# Patient Record
Sex: FEMALE | Race: BLACK OR AFRICAN AMERICAN
[De-identification: names, ages, dates, MRNs, and addresses within clinical notes are randomized per-mention and may not be internally consistent; named-entity substitution may affect disease eponyms.]

---

## 2017-04-14 ENCOUNTER — HOSPITAL ENCOUNTER (EMERGENCY)
Dept: HOSPITAL 62 - ER | Age: 27
Discharge: HOME | End: 2017-04-14
Payer: COMMERCIAL

## 2017-04-14 VITALS — DIASTOLIC BLOOD PRESSURE: 75 MMHG | SYSTOLIC BLOOD PRESSURE: 111 MMHG

## 2017-04-14 DIAGNOSIS — H00.011: Primary | ICD-10-CM

## 2017-04-14 DIAGNOSIS — Z86.14: ICD-10-CM

## 2017-04-14 DIAGNOSIS — H01.001: ICD-10-CM

## 2017-04-14 DIAGNOSIS — F17.200: ICD-10-CM

## 2017-04-14 LAB
ALBUMIN SERPL-MCNC: 4.6 G/DL (ref 3.5–5)
ALP SERPL-CCNC: 66 U/L (ref 38–126)
ALT SERPL-CCNC: 29 U/L (ref 9–52)
ANION GAP SERPL CALC-SCNC: 14 MMOL/L (ref 5–19)
AST SERPL-CCNC: 15 U/L (ref 14–36)
BASOPHILS # BLD AUTO: 0.1 10^3/UL (ref 0–0.2)
BASOPHILS NFR BLD AUTO: 1.1 % (ref 0–2)
BILIRUB DIRECT SERPL-MCNC: 0.1 MG/DL (ref 0–0.4)
BILIRUB SERPL-MCNC: 0.6 MG/DL (ref 0.2–1.3)
BUN SERPL-MCNC: 14 MG/DL (ref 7–20)
CALCIUM: 9.4 MG/DL (ref 8.4–10.2)
CHLORIDE SERPL-SCNC: 106 MMOL/L (ref 98–107)
CO2 SERPL-SCNC: 23 MMOL/L (ref 22–30)
CREAT SERPL-MCNC: 0.73 MG/DL (ref 0.52–1.25)
EOSINOPHIL # BLD AUTO: 0.1 10^3/UL (ref 0–0.6)
EOSINOPHIL NFR BLD AUTO: 1.3 % (ref 0–6)
ERYTHROCYTE [DISTWIDTH] IN BLOOD BY AUTOMATED COUNT: 13.4 % (ref 11.5–14)
GLUCOSE SERPL-MCNC: 91 MG/DL (ref 75–110)
HCT VFR BLD CALC: 32.2 % (ref 36–47)
HGB BLD-MCNC: 11.3 G/DL (ref 12–15.5)
HGB HCT DIFFERENCE: 1.7
LYMPHOCYTES # BLD AUTO: 2.3 10^3/UL (ref 0.5–4.7)
LYMPHOCYTES NFR BLD AUTO: 38.6 % (ref 13–45)
MCH RBC QN AUTO: 30.6 PG (ref 27–33.4)
MCHC RBC AUTO-ENTMCNC: 35.1 G/DL (ref 32–36)
MCV RBC AUTO: 87 FL (ref 80–97)
MONOCYTES # BLD AUTO: 0.5 10^3/UL (ref 0.1–1.4)
MONOCYTES NFR BLD AUTO: 8.1 % (ref 3–13)
NEUTROPHILS # BLD AUTO: 3 10^3/UL (ref 1.7–8.2)
NEUTS SEG NFR BLD AUTO: 50.9 % (ref 42–78)
POTASSIUM SERPL-SCNC: 3.7 MMOL/L (ref 3.6–5)
PROT SERPL-MCNC: 8.3 G/DL (ref 6.3–8.2)
RBC # BLD AUTO: 3.69 10^6/UL (ref 3.72–5.28)
SODIUM SERPL-SCNC: 143.3 MMOL/L (ref 137–145)
WBC # BLD AUTO: 6 10^3/UL (ref 4–10.5)

## 2017-04-14 PROCEDURE — 80053 COMPREHEN METABOLIC PANEL: CPT

## 2017-04-14 PROCEDURE — 96365 THER/PROPH/DIAG IV INF INIT: CPT

## 2017-04-14 PROCEDURE — 36415 COLL VENOUS BLD VENIPUNCTURE: CPT

## 2017-04-14 PROCEDURE — 99283 EMERGENCY DEPT VISIT LOW MDM: CPT

## 2017-04-14 PROCEDURE — 96375 TX/PRO/DX INJ NEW DRUG ADDON: CPT

## 2017-04-14 PROCEDURE — 85025 COMPLETE CBC W/AUTO DIFF WBC: CPT

## 2017-04-14 NOTE — ER DOCUMENT REPORT
HPI





- HPI


Pain Level: 5





- REPRODUCTIVE


Reproductive: DENIES: Pregnant:





- DERM


Skin Color: Normal





Past Medical History





- Social History


Family History: Arthritis, CAD, CVA, DM, Hyperlipidemia, Hypertension, 

Malignancy, Thyroid Disfunction


Patient has suicidal ideation: No


Patient has homicidal ideation: No


Pulmonary Medical History: Reports: Hx Bronchitis, Hx Pneumonia


Renal/ Medical History: Denies: Hx Peritoneal Dialysis


Past Surgical History: Reports: Hx Tonsillectomy





- Immunizations


Hx Diphtheria, Pertussis, Tetanus Vaccination: No - declined





Vertical Provider Document





- INFECTION CONTROL


TRAVEL OUTSIDE OF THE [place] IN LAST 30 DAYS: No





- RESPIRATORY


O2 Sat by Pulse Oximetry: 99





Course





- Vital Signs


Vital signs: 


 











Temp Pulse Resp BP Pulse Ox


 


 98.4 F   102 H  17   121/88 H  99 


 


 04/14/17 11:23  04/14/17 11:23  04/14/17 11:23  04/14/17 11:23  04/14/17 11:23

## 2017-04-14 NOTE — ER DOCUMENT REPORT
HPI





- HPI


Patient complains to provider of: right upper eyelid stye


Onset: Other - 3 days ago


Onset/Duration: Gradual, Persistent, Worse


Pain Level: 5


Context: 


26-year-old female with a history of a left eyelid stye that resolved with warm 

compresses in spontaneous drainage, is now complaining of a stye in the right 

medial upper lid with swelling and pain that extends along the right upper lid 

margin.  She has a history of MRSA.  She states her vision is normal out of the 

right eye.


Associated Symptoms: None


Exacerbated by: Movement - Eyelid


Relieved by: Denies


Similar symptoms previously: Yes - left eyelid


Recently seen / treated by doctor: No





- ROS


ROS below otherwise negative: Yes


Systems Reviewed and Negative: Yes All other systems reviewed and negative





- REPRODUCTIVE


Reproductive: DENIES: Pregnant:





- DERM


Skin Color: Normal





Past Medical History





- General


Information source: Patient





- Social History


Smoking Status: Current Every Day Smoker


Frequency of alcohol use: None


Drug Abuse: None


Lives with: Family


Family History: Arthritis, CAD, CVA, DM, Hyperlipidemia, Hypertension, 

Malignancy, Thyroid Disfunction


Patient has suicidal ideation: No


Patient has homicidal ideation: No


Pulmonary Medical History: Reports: Hx Bronchitis, Hx Pneumonia


Renal/ Medical History: Denies: Hx Peritoneal Dialysis


Past Surgical History: Reports: Hx Tonsillectomy





- Immunizations


Hx Diphtheria, Pertussis, Tetanus Vaccination: No - declined





Vertical Provider Document





- CONSTITUTIONAL


Agree With Documented VS: Yes


Exam Limitations: No Limitations





- INFECTION CONTROL


TRAVEL OUTSIDE OF THE [place] IN LAST 30 DAYS: No





- HEENT


HEENT: Normocephalic, PERRLA.  negative: Conjuctival Injection


Notes: 


Medial right upper eyelid abscess at the tear duct, cellulitis extending along 

the eyelash border, puffy superior upper eyelid, and pink inferior orbital skin





- NECK


Neck: Supple





- RESPIRATORY


Respiratory: Breath Sounds Normal, No Respiratory Distress


O2 Sat by Pulse Oximetry: 99





- CARDIOVASCULAR


Cardiovascular: Regular Rate, Regular Rhythm





- MUSCULOSKELETAL/EXTREMETIES


Musculoskeletal/Extremeties: MAEW, FROM





- NEURO


Level of Consciousness: Awake, Alert, Appropriate





- DERM


Integumentary: Warm, Dry





Course





- Re-evaluation


Re-evalutation: 





04/14/17 12:53


Dr. Chopra looked at the patient warm compresses oral antibiotics is fine.





- Vital Signs


Vital signs: 


 











Temp Pulse Resp BP Pulse Ox


 


 98.4 F   102 H  17   121/88 H  99 


 


 04/14/17 11:23  04/14/17 11:23  04/14/17 11:23  04/14/17 11:23  04/14/17 11:36














- Laboratory


Result Diagrams: 


 04/14/17 12:10





 04/14/17 12:10





Discharge





- Discharge


Clinical Impression: 


Blepharitis


Qualifiers:


 Blepharitis type: unspecified type Laterality: bilateral Eyelid: both upper 

and lower Qualified Code(s): H01.001 - Unspecified blepharitis right upper 

eyelid





Stye


Qualifiers:


 Laterality: right Eyelid: upper Qualified Code(s): H00.011 - Hordeolum 

externum right upper eyelid





Condition: Good


Disposition: HOME, SELF-CARE


Instructions:  Clindamycin ([facility]), Use of Over-The-Counter Ibuprofen ([facility]), Warm 

Packs ([facility]), Sty ([facility]), Acetaminophen


Additional Instructions: 


Wash her eyelids with baby shampoo and fingernails to remove makeup and oils


Warm compress to the right eye stye 4 times a day for 30 minutes


Return to the emergency room if worse


Take the antibiotics until gone


Return to the emergency room any concerns





Please complete the patient satisfaction survey if you get one, and return it.. 

If you do not receive a survey,  then you can go to the [facility] website, [email or web address] 

and place your comments about your very good care. Thank you very much. It was 

a pleasure being your medical provider today.


Prescriptions: 


Ibuprofen [Motrin 600 mg Tablet] 600 mg PO Q8HP PRN #30 tablet


 PRN Reason: 


Clindamycin HCl [Cleocin 150 mg Capsule] 300 mg PO TID #42 capsule


Forms:  Return to Work

## 2017-06-05 ENCOUNTER — HOSPITAL ENCOUNTER (EMERGENCY)
Dept: HOSPITAL 62 - ER | Age: 27
Discharge: HOME | End: 2017-06-05
Payer: COMMERCIAL

## 2017-06-05 VITALS — DIASTOLIC BLOOD PRESSURE: 78 MMHG | SYSTOLIC BLOOD PRESSURE: 118 MMHG

## 2017-06-05 DIAGNOSIS — L02.412: Primary | ICD-10-CM

## 2017-06-05 DIAGNOSIS — L02.411: ICD-10-CM

## 2017-06-05 DIAGNOSIS — F17.210: ICD-10-CM

## 2017-06-05 PROCEDURE — 99283 EMERGENCY DEPT VISIT LOW MDM: CPT

## 2017-06-05 PROCEDURE — 0H9CXZZ DRAINAGE OF LEFT UPPER ARM SKIN, EXTERNAL APPROACH: ICD-10-PCS | Performed by: NURSE PRACTITIONER

## 2017-06-05 PROCEDURE — 87205 SMEAR GRAM STAIN: CPT

## 2017-06-05 PROCEDURE — 87075 CULTR BACTERIA EXCEPT BLOOD: CPT

## 2017-06-05 PROCEDURE — 10061 I&D ABSCESS COMP/MULTIPLE: CPT

## 2017-06-05 PROCEDURE — 0H9BXZZ DRAINAGE OF RIGHT UPPER ARM SKIN, EXTERNAL APPROACH: ICD-10-PCS | Performed by: NURSE PRACTITIONER

## 2017-06-05 PROCEDURE — 87070 CULTURE OTHR SPECIMN AEROBIC: CPT

## 2017-06-05 PROCEDURE — 87186 SC STD MICRODIL/AGAR DIL: CPT

## 2017-06-05 PROCEDURE — 87077 CULTURE AEROBIC IDENTIFY: CPT

## 2017-06-05 NOTE — ER DOCUMENT REPORT
ED Skin Rash/Insect Bite/Abscs





- General


Chief Complaint: Abscess


Stated Complaint: ARM PAIN


Time Seen by Provider: 06/05/17 11:46


Mode of Arrival: Ambulatory


Information source: Patient


Notes: 


26-year-old female presents to ED for abscess to both axilla.  Started about 3-

4 days ago.  Last menstrual period was May 22.  Medical history is cellulitis 

and abscesses.


TRAVEL OUTSIDE OF THE [place] IN LAST 30 DAYS: No





- HPI


Patient complains to provider of: Tender/swollen area


Onset: Other - Days ago


Onset/Duration: Gradual


Quality of pain: Sharp, Throbbing


Severity: Severe


Pain Level: 5


Skin Character: Abscess


Quality of rash: Painful


Identify cause: No


Exacerbated by: Movement


Relieved by: Denies


Similar symptoms previously: Yes


Recently seen / treated by doctor: No





- Related Data


Allergies/Adverse Reactions: 


 





No Known Allergies Allergy (Verified 06/05/17 10:46)


 











Past Medical History





- General


Information source: Patient





- Social History


Smoking Status: Current Some Day Smoker


Cigarette use (# per day): Yes - Twice a week


Chew tobacco use (# tins/day): No


Family History: Arthritis, CAD, CVA, DM, Hyperlipidemia, Hypertension, 

Malignancy, Thyroid Disfunction


Patient has suicidal ideation: No


Patient has homicidal ideation: No


Pulmonary Medical History: Reports: Hx Bronchitis, Hx Pneumonia


EENT Medical History: Reports: None


Neurological Medical History: Reports: None


Endocrine Medical History: Reports: None


Renal/ Medical History: Reports: None


Malignancy Medical History: Reports: None


GI Medical History: Reports: None


Musculoskeltal Medical History: Reports None


Skin Medical History: Reports None


Psychiatric Medical History: Reports: None


Traumatic Medical History: Reports: None


Infectious Medical History: Reports: None


Past Surgical History: Reports: Hx Tonsillectomy





- Immunizations


Hx Diphtheria, Pertussis, Tetanus Vaccination: No - declined





Review of Systems





- Review of Systems


Constitutional: No symptoms reported


EENT: No symptoms reported


Cardiovascular: No symptoms reported


Respiratory: No symptoms reported


Gastrointestinal: No symptoms reported


Genitourinary: No symptoms reported


Female Genitourinary: No symptoms reported


Musculoskeletal: No symptoms reported


Skin: Other - From the bilateral axilla


Hematologic/Lymphatic: No symptoms reported


Neurological/Psychological: No symptoms reported


-: Yes All other systems reviewed and negative





Physical Exam





- Vital signs


Vitals: 


 











Temp Pulse Resp BP Pulse Ox


 


 98.5 F   105 H  18   118/78   98 


 


 06/05/17 10:46  06/05/17 10:46  06/05/17 10:46  06/05/17 10:46  06/05/17 10:46











Interpretation: Normal





- General


General appearance: Appears well, Alert





- HEENT


Head: Normocephalic, Atraumatic


Eyes: Normal


Pupils: PERRL





- Respiratory


Respiratory status: No respiratory distress


Chest status: Nontender


Breath sounds: Normal


Chest palpation: Normal





- Cardiovascular


Rhythm: Regular


Heart sounds: Normal auscultation


Murmur: No





- Abdominal


Inspection: Normal


Distension: No distension


Bowel sounds: Normal


Tenderness: Nontender


Organomegaly: No organomegaly





- Back


Back: Normal, Nontender





- Extremities


General upper extremity: Normal inspection, Nontender, Normal color, Normal ROM

, Normal temperature


General lower extremity: Normal inspection, Nontender, Normal color, Normal ROM

, Normal temperature, Normal weight bearing.  No: Hollis's sign





- Neurological


Neuro grossly intact: Yes


Cognition: Normal


Orientation: AAOx4


Dylan Coma Scale Eye Opening: Spontaneous


Bedford Coma Scale Verbal: Oriented


[place]ford Coma Scale Motor: Obeys Commands


[place]ford Coma Scale Total: 15


Speech: Normal


Motor strength normal: LUE, RUE, LLE, RLE


Sensory: Normal





- Psychological


Associated symptoms: Normal affect, Normal mood





- Skin


Skin Temperature: Warm


Skin Moisture: Dry


Skin Color: Normal


Skin irregularity: Abscess - Bilateral axilla





Course





- Vital Signs


Vital signs: 


 











Temp Pulse Resp BP Pulse Ox


 


 98.5 F   105 H  18   118/78   98 


 


 06/05/17 10:46  06/05/17 10:46  06/05/17 11:55  06/05/17 10:46  06/05/17 10:46














Procedures





- Incision and Drainage


  ** Right axilla


Type: Multiple


Anesthetic type: 1% Lidocaine


mL's of anesthetic: 6


Blade size: 11


I&D procedure: Other


Incision Method: Incision made with needle


Amount/type of drainage: large amount purulent drainage





  ** Left Axilla


Type: Multiple


Anesthetic type: 1% Lidocaine


mL's of anesthetic: 6


Blade size: 11


I&D procedure: Other


Incision Method: Incision made by scalpel


Amount/type of drainage: large amount of purulent drainage





Discharge





- Discharge


Clinical Impression: 


 Abscess





Condition: Stable


Disposition: HOME, SELF-CARE


Instructions:  Family Physicians / Practices


Additional Instructions: 


ABSCESS:





     You have an abscess (boil).  This a pus-forming infection, usually due to 

staph.  Some boils may be left to drain on their own, but most require lancing.


     From the time the tender lump first appears, it may be three or four days 

before the abscess is ready to yeni.  Local heat and rest help at this stage 

of treatment.  An antibiotic may prevent spread of the infection.


     Once the abscess is opened, packing may be placed into it.  This is done 

so pus is not sealed inside by premature closure of the cavity. The packing 

will be removed at your follow-up visit or you may be advised to remove it 

yourself at home.  Sometimes this packing must be replaced a few times during 

healing.


     The wound will heal with surprisingly little scar.  Depending on the size 

and location of an abscess, healing can take one to four weeks.


     You may shower and wash the area around the incision site two or three 

times a day.


     Antibiotics may be prescribed, but are usually not necessary after an 

abscess has been drained.


     If you develop fever, chills, worsening pain, or increasing swelling in 

the area, call the doctor or return immediately.








POST INCISION AND DRAINAGE:


     You have had an incision made to allow drainage of an abscess. The 

incision must remain open so that pus and debris can drain from the wound.


     If the abscess cavity is large, packing is placed.  This keeps the tissues 

from collapsing and trapping pus inside, while the body shrinks the cavity.  

The packing may need to be replaced every day or two.  The physician will 

instruct you on the packing.


     Keep a bulky dressing over the area.  Replace it if it becomes saturated 

with blood or pus.  Do not disturb the packing (if present).


     You may shower and cleanse the area with gentle soap and warm water two or 

three times a day.  Local warmth may be soothing, and may promote faster 

healing.


     Return if you develop high fever or chills, or if you note spreading 

redness, increasing swelling, or increasing tenderness.








MRSA CELLULITIS:


     You have an infection of your skin and underlying soft tissues called 

cellulitis.  This is due to bacteria, which can enter through any break in the 

skin, or even through an irritated hair follicle.  Untreated, cellulitis will 

usually worsen and may form an abscess which requires draining.


     Although many bacterial organisms can cause cellulitis and abscess 

formations, the most likely bacteria is Methicillin-Resistant Staph Aureus, or 

MRSA for short.  Antibiotics are required.  Usually, warm packs or warm soaks, 

and elevation of the infected area are recommended.  You should start getting 

better within 24 to 36 hours.


     Most infections respond quickly to the right medication. Follow-up care is 

important, however, to check for abscess (boil) formation, unsuspected foreign 

body, or resistant infection.


     If you develop fever, chills, or if the area of infection is becoming 

rapidly more swollen or painful, call the doctor at once.








ORAL NARCOTIC MEDICATION:


     You have been given a prescription for pain control.  This medication is a 

narcotic.  It's best taken with food, as nausea can result if taken on an empty 

stomach.


     Don't operate machinery or drive within six hours of taking this 

medication.  Do not combine this medicine with alcohol, or with any medication 

which can cause sedation (such as cold tablets or sleeping pills) unless you 

get permission from the physician.


     Narcotics tend to cause constipation.  If possible, drink plenty of fluids 

and eat a diet high in fiber and fruits.





Epsom Salt Soaks





     Soak the wound area in a container of warm epsom salt water.  If you can't 

get the wound area into a bucket or pan, use a folded towel soaked in the epsom 

salt solution and apply to the area.


     Use clean hot tap water (about the temperature of a very warm bath), 

mixing in about one (1) teaspoon for every pint of water.


           Two gallon --> 16 teaspoons Epsom Salts


           One gallon -->  8 teaspoons Epsom Salts


           Two quarts -->  4 teaspoons Epsom Salts


           One quart  -->  2 teaspoons Epsom Salts


     Soak the wound for about 20 minutes while gently moving it around in the 

water.  Repeat this four (4) times a day.





CEPHALEXIN:


     The antibiotic you've been prescribed is a member of the cephalosporin 

class.  This type of antibiotic covers a wide variety of infections, including 

those of the skin, lungs, and urinary tract. It's useful for staph infections.


     This antibiotic is slightly similar to the penicillin family. In rare cases

, a person who is allergic to penicillin will also be allergic to this 

medication.  If you have had a severe allergic reaction to penicillin, and have 

not taken this antibiotic since that time, notify your doctor.


     Antibiotics which cover many germs ("broad spectrum" antibiotics) are more 

likely to cause diarrhea or "yeast" infections.  Women prone to vaginal yeast 

problems may suffer an attack after taking this antibiotic.  In infants, oral 

thrush (white spots "stuck" on the cheek) or yeast diaper rash may result.  See 

your doctor if these problems occur.  Call at once if you develop itching, hives

, shortness of breath, or lightheadedness.








TRIMETHOPRIM-SULFA:


     You have been given a prescription for trimethoprim-sulfa (TMS, Septra, 

Bactrim).  This is a combination antibiotic of the sulfa class, often used for 

urinary tract infections, middle ear infections, bronchitis, shigella 

intestinal infection, and Pneumocystis pneumonia.


     TMS is usually well-tolerated.  Occasional side effects include nausea and 

decreased appetite.  Septra is not recommended for infants less than two months 

of age.  Do not take this medication if you have experienced severe side 

effects or allergy to sulfa medicine.


     You should stop this medicine at once and contact your physician if you 

develop any rash, joint pain, shortness of breath, bruising, or jaundice (

yellow color in the skin), or if you develop any other new or unusual symptoms.











FOLLOW-UP CARE:


Most simple abscesses will not require a follow up visit.   If you had packing 

placed in the abscess, remove it as instructed by the physician.  If you have 

been referred to a physician for follow-up care, call the physicians office 

for an appointment as you were instructed or within the next two days.  If you 

experience worsening or a significant change in your symptoms, return to the 

Emergency Department at any time for re-evaluation.





Prescriptions: 


Cephalexin Monohydrate [Keflex 500 mg Capsule] 500 mg PO QID #28 capsule


Hydrocodone/Acetaminophen [Norco 5-325 mg Tablet] 1 tab PO TIDP PRN #7 tablet


 PRN Reason: 


Sulfamethoxazole/Trimethoprim [Septra-Ds 800-160 mg Tablet] 1 tab PO BID #20 

tablet


Forms:  Smoking Cessation Education, Return to Work

## 2017-06-21 ENCOUNTER — HOSPITAL ENCOUNTER (EMERGENCY)
Dept: HOSPITAL 62 - ER | Age: 27
LOS: 1 days | Discharge: HOME | End: 2017-06-22
Payer: COMMERCIAL

## 2017-06-21 DIAGNOSIS — R10.84: ICD-10-CM

## 2017-06-21 DIAGNOSIS — R11.2: ICD-10-CM

## 2017-06-21 DIAGNOSIS — L02.411: ICD-10-CM

## 2017-06-21 DIAGNOSIS — B95.62: ICD-10-CM

## 2017-06-21 DIAGNOSIS — L02.412: Primary | ICD-10-CM

## 2017-06-21 LAB
ALBUMIN SERPL-MCNC: 4.9 G/DL (ref 3.5–5)
ALP SERPL-CCNC: 66 U/L (ref 38–126)
ALT SERPL-CCNC: 28 U/L (ref 9–52)
ANION GAP SERPL CALC-SCNC: 14 MMOL/L (ref 5–19)
APPEARANCE UR: (no result)
AST SERPL-CCNC: 15 U/L (ref 14–36)
BASOPHILS # BLD AUTO: 0 10^3/UL (ref 0–0.2)
BASOPHILS NFR BLD AUTO: 0.7 % (ref 0–2)
BILIRUB DIRECT SERPL-MCNC: 0.3 MG/DL (ref 0–0.4)
BILIRUB SERPL-MCNC: 0.4 MG/DL (ref 0.2–1.3)
BILIRUB UR QL STRIP: NEGATIVE
BUN SERPL-MCNC: 7 MG/DL (ref 7–20)
CALCIUM: 9.8 MG/DL (ref 8.4–10.2)
CHLORIDE SERPL-SCNC: 103 MMOL/L (ref 98–107)
CO2 SERPL-SCNC: 24 MMOL/L (ref 22–30)
CREAT SERPL-MCNC: 0.71 MG/DL (ref 0.52–1.25)
EOSINOPHIL # BLD AUTO: 0.1 10^3/UL (ref 0–0.6)
EOSINOPHIL NFR BLD AUTO: 1 % (ref 0–6)
ERYTHROCYTE [DISTWIDTH] IN BLOOD BY AUTOMATED COUNT: 12.5 % (ref 11.5–14)
GLUCOSE SERPL-MCNC: 94 MG/DL (ref 75–110)
GLUCOSE UR STRIP-MCNC: NEGATIVE MG/DL
HCT VFR BLD CALC: 35.9 % (ref 36–47)
HGB BLD-MCNC: 12 G/DL (ref 12–15.5)
HGB HCT DIFFERENCE: 0.1
KETONES UR STRIP-MCNC: NEGATIVE MG/DL
LIPASE SERPL-CCNC: 56.1 U/L (ref 23–300)
LYMPHOCYTES # BLD AUTO: 2.6 10^3/UL (ref 0.5–4.7)
LYMPHOCYTES NFR BLD AUTO: 38 % (ref 13–45)
MCH RBC QN AUTO: 29.8 PG (ref 27–33.4)
MCHC RBC AUTO-ENTMCNC: 33.3 G/DL (ref 32–36)
MCV RBC AUTO: 90 FL (ref 80–97)
MONOCYTES # BLD AUTO: 0.7 10^3/UL (ref 0.1–1.4)
MONOCYTES NFR BLD AUTO: 9.7 % (ref 3–13)
NEUTROPHILS # BLD AUTO: 3.4 10^3/UL (ref 1.7–8.2)
NEUTS SEG NFR BLD AUTO: 50.6 % (ref 42–78)
NITRITE UR QL STRIP: NEGATIVE
PH UR STRIP: 7 [PH] (ref 5–9)
POTASSIUM SERPL-SCNC: 4.1 MMOL/L (ref 3.6–5)
PROT SERPL-MCNC: 9.5 G/DL (ref 6.3–8.2)
PROT UR STRIP-MCNC: NEGATIVE MG/DL
RBC # BLD AUTO: 4.01 10^6/UL (ref 3.72–5.28)
SODIUM SERPL-SCNC: 141.3 MMOL/L (ref 137–145)
SP GR UR STRIP: 1.02
UROBILINOGEN UR-MCNC: NEGATIVE MG/DL (ref ?–2)
WBC # BLD AUTO: 6.7 10^3/UL (ref 4–10.5)

## 2017-06-21 PROCEDURE — 87075 CULTR BACTERIA EXCEPT BLOOD: CPT

## 2017-06-21 PROCEDURE — 99284 EMERGENCY DEPT VISIT MOD MDM: CPT

## 2017-06-21 PROCEDURE — 76700 US EXAM ABDOM COMPLETE: CPT

## 2017-06-21 PROCEDURE — 83690 ASSAY OF LIPASE: CPT

## 2017-06-21 PROCEDURE — 85025 COMPLETE CBC W/AUTO DIFF WBC: CPT

## 2017-06-21 PROCEDURE — 87070 CULTURE OTHR SPECIMN AEROBIC: CPT

## 2017-06-21 PROCEDURE — 36415 COLL VENOUS BLD VENIPUNCTURE: CPT

## 2017-06-21 PROCEDURE — 81001 URINALYSIS AUTO W/SCOPE: CPT

## 2017-06-21 PROCEDURE — 87205 SMEAR GRAM STAIN: CPT

## 2017-06-21 PROCEDURE — 87186 SC STD MICRODIL/AGAR DIL: CPT

## 2017-06-21 PROCEDURE — 80053 COMPREHEN METABOLIC PANEL: CPT

## 2017-06-21 PROCEDURE — 81025 URINE PREGNANCY TEST: CPT

## 2017-06-21 PROCEDURE — 0H9CXZZ DRAINAGE OF LEFT UPPER ARM SKIN, EXTERNAL APPROACH: ICD-10-PCS | Performed by: PHYSICIAN ASSISTANT

## 2017-06-21 PROCEDURE — 87077 CULTURE AEROBIC IDENTIFY: CPT

## 2017-06-21 PROCEDURE — 10060 I&D ABSCESS SIMPLE/SINGLE: CPT

## 2017-06-21 NOTE — ER DOCUMENT REPORT
ED General





- General


Chief Complaint: Abdominal Pain


Stated Complaint: STOMACH PAIN


Time Seen by Provider: 06/21/17 21:20


TRAVEL OUTSIDE OF THE [place] IN LAST 30 DAYS: No





- HPI


Notes: 


Patient is a 25yo female who presents with 2 concerns:  


First concern is generalized abd pain with associated nausea x1 week.  Pt 

states that she did vomit several times yesterday (without hematemesis), but 

none today.  She also has had loose stool x1-2 days without any melena or 

hematochezia.  The abd pain will radiate to her left flank "a little."  Her 

pain is described as "cramping."  Pt reports a fever yesterday but none since 

then.  She has been taking ibuprofen for her discomfort.  She has had dec 

appetite/fluids, but is able to keep food down when she is taking her meds.  + 

general body aches.  Denies any URI, sore throat, CP, sob, dyspnea, dysuria, 

hematuria, urinary frequency, vaginal discharge/odor, vaginal bleeding, or 

rash.  





Second concern is an abscess to her left axilla x2-3 days.  Pt has a h/o MRSA.  

She had to have an I&D performed 05 June 2017 and was given keflex/bactrim.  

She has been using moist warm compresses without relief.  Pt reports pain.  She 

has noticed some bloody discharge.  Pt has not been shaving her armpits for the 

last month due to recurrent infections.





- Related Data


Allergies/Adverse Reactions: 


 





No Known Allergies Allergy (Verified 06/05/17 10:46)


 











Past Medical History





- Social History


Smoking Status: Never Smoker


Family History: Arthritis, CAD, CVA, DM, Hyperlipidemia, Hypertension, 

Malignancy, Thyroid Disfunction


Patient has suicidal ideation: No


Patient has homicidal ideation: No


Pulmonary Medical History: Reports: Hx Bronchitis, Hx Pneumonia


Renal/ Medical History: Denies: Hx Peritoneal Dialysis


Past Surgical History: Reports: Hx Tonsillectomy





- Immunizations


Hx Diphtheria, Pertussis, Tetanus Vaccination: No - declined





Review of Systems





- Review of Systems


Notes: 


REVIEW OF SYSTEMS:


CONSTITUTIONAL :  see hpi


EENT:   Denies eye, ear, throat, or mouth pain or symptoms.  Denies nasal or 

sinus congestion or discharge.  Denies throat, tongue, or mouth swelling or 

difficulty swallowing.


CARDIOVASCULAR:  Denies chest pain.  Denies palpitations or racing or irregular 

heart beat.  Denies ankle edema.


RESPIRATORY:  Denies cough, cold, or chest congestion.  Denies shortness of 

breath, difficulty breathing, or wheezing.


GASTROINTESTINAL:  see hpi


GENITOURINARY:  Denies difficulty urinating, painful urination, burning, 

frequency, blood in urine, or discharge.


FEMALE  GENITOURINARY:  Denies vaginal bleeding, heavy or abnormal periods, 

irregular periods.  Denies vaginal discharge or odor. 


MUSCULOSKELETAL:  see hpi


SKIN:   see hpi


NEUROLOGICAL:  Denies confusion or altered mental status.  Denies passing out 

or loss of consciousness.  Denies dizziness or lightheadedness.  Denies 

headache.  Denies weakness or paralysis or loss of use of either side.  Denies 

problems with gait or speech.  Denies sensory loss, numbness, or tingling.  

Denies seizures.





ALL OTHER SYSTEMS REVIEWED AND NEGATIVE.








Dictation was performed using Dragon voice recognition software





Physical Exam





- Vital signs


Vitals: 


 











Temp Pulse Resp BP Pulse Ox


 


 98.5 F   84   18   126/87 H  100 


 


 06/21/17 19:18  06/21/17 19:18  06/21/17 19:18  06/21/17 19:18  06/21/17 19:18











Notes: 


PHYSICAL EXAMINATION:





GENERAL: Well-appearing, well-nourished and in no acute distress.





HEAD: Atraumatic, normocephalic.





EYES: Pupils equal round and reactive to light, extraocular movements intact, 

sclera anicteric, conjunctiva are normal.





ENT: EAC clear b/l.  TM's intact b/l without erythema, fluid, or perforation.  

Nares patent and without discharge.  oropharynx clear without exudates.  No 

tonsilar hypertrophy or erythema.  Moist mucous membranes.  No sinus tenderness.





NECK: Normal range of motion, supple without lymphadenopathy.  No nuchal 

rigidity/meningismus.





LUNGS: Breath sounds clear to auscultation bilaterally and equal.  No wheezes 

rales or rhonchi.





HEART: Regular rate and rhythm without murmurs, rubs, gallops.





ABDOMEN: Soft, nondistended abdomen.  No guarding, no rebound.  No masses 

appreciated.  Normal bowel sounds present.  No CVA tenderness bilaterally. + 

tenderness to light palpation of general abdomen.  Villasenor negative.  Psoas/

obturator/rosving's negative.  No pulsatile mass appreciated.





Musculoskeletal: FROM to passive/active. Strength 5+/5. 





Extremities:  No cyanosis, clubbing, or edema b/l.  Peripheral pulses 2+.  

Capillary refill less than 3 seconds.





NEUROLOGICAL: Cranial nerves grossly intact.  Normal speech, normal gait.  

Normal sensory, motor exams 





PSYCH: Normal mood, normal affect.





SKIN: + erythema, swelling, abscess left axilla (approx [id number]) with small 

amount of bloody discharge noted.  + induration.  + small (0.25-0.cm abscess rt 

axilla), + small white pustular superficial pocket noted. + tenderness, no d/c. 

Minimal induration to rt.. No streaks.  








Course





- Re-evaluation


Re-evalutation: 


06/22/17 02:22


Patient is an afebrile, well-hydrated, 25yo female who presents with an abscess

, suspect MRSA based on history along with abdominal pain NOS, suspect 

gastroenteritis. Vitals are stable.  Pt resistent to thorough I&D due to low 

pain tolerance.  CBC/CMP unremarkable.  Abd US unremarkable.  wound culture 

pending, but suspect MRSA.  I will cover her with Doxy 100mg PO BID x10 days.  

Conservative measures otherwise along with wound care as directed.  Based on 

work up, H&P, I have low suspicion for acute appendicitis, obstruction, 

cholecystitis, diverticulitis, hernia, pancreatitis, PID (also covered with doxy

), ectopic preg, ovarian abscess/torsion, and/or perforated ulceration.  Pt 

advised to recheck with PCM in 2-3 days for a recheck, may return to the ED for 

recheck as well.  Return to the ED otherwise with any worsening symptoms.  Pt/

BF in agreement.














- Vital Signs


Vital signs: 


 











Temp Pulse Resp BP Pulse Ox


 


 98.5 F   84   18   126/87 H  100 


 


 06/21/17 19:18  06/21/17 19:18  06/21/17 19:18  06/21/17 19:18  06/21/17 19:18














- Laboratory


Result Diagrams: 


 06/21/17 21:54





 06/21/17 21:54


Laboratory results interpreted by me: 


 











  06/21/17 06/21/17 06/21/17





  21:54 21:54 21:54


 


Hct  35.9 L  


 


Total Protein   9.5 H 


 


Ur Leukocyte Esterase    TRACE H


 


Urine Ascorbic Acid    40 H














Procedures





- Incision and Drainage


  ** Left Arm


Time completed: 01:30


Type: Simple


Anesthetic type: 1% Lidocaine


mL's of anesthetic: 8


Blade size: 11


I&D procedure: Shurclens applied, Sterile dressing applied, Other - sterile 

gauze pack


Incision Method: Incision made by scalpel


Amount/type of drainage: moderate pustular/bloody material


Notes: 


06/22/17 0130


Incision and drainage procedure, risks, benefits reviewed with the patient.


Verbal and written consent obtained.


Sterile technique utilized.


The area was extensively cleansed utilizing shurclens and saline.


A 21-gauge needle was utilized to anesthetize the area using 8 mL's of 1% 

lidocaine w/o epinephrine.


Once adequate anesthesia was provided, a #11 scalpel was utilized to make a 3 

cm incision at the site of the abscess.


Superficial tissue excised to create a wide opening


Moderate amount of pustular/bloody material was expressed.


Wound culture obtained.


Hemostats were then utilized to break up any remaining muscular pockets within 

the abscess.


The wound was then lightly packed using sterile gauze.


Wound dressing and triple antibiotic placed.


Minimal blood loss (approximately 2-3 cc's).


Patient did not allow for complete break up of tissue due to discomfort.  Pt 

seemed very sensitive to pain in general. 


No complications.








Discharge





- Discharge


Clinical Impression: 


 Abscess, MRSA (methicillin resistant Staphylococcus aureus)





Abdominal pain


Qualifiers:


 Abdominal location: generalized Qualified Code(s): R10.84 - Generalized 

abdominal pain





Condition: Stable


Disposition: HOME, SELF-CARE


Instructions:  Abdominal Pain ([facility]), Abscess ([facility]), Antibiotic Therapy ([facility]), 

Antinausea Medication ([facility]), MRSA Cellulitis ([facility]), Oral Narcotic Medication (

[facility]), Post Incision and Drainage


Additional Instructions: 


Do not shower or bathe for 24 hours.  After 24 hours she may shower but no 

submersion of the wound under water.  Keep the original dressing on the wound 

for 24 hours unless the drainage stops through.  Change the dressing daily 

thereafter and use a small amount of triple antibiotic ointment over the open 

wound.  Epsom salt soaks. Return to the ED and/or your PCM in 2-3 days for 

recheck and continue direction for wound packing.  Monitor for any signs of 

worsening pain or redness, streaks, and/or fever.  Push fluids. Return to the 

ED if noticing any of the above symptoms and/or worsening abdominal pain, nausea

/vomiting, bloody vomit, blood in stool, worsening pains as needed.  Take 

medications as directed.   


Prescriptions: 


Doxycycline Hyclate 100 mg PO BID #20 capsule


Hydrocodone/Acetaminophen [Norco 5-325 mg Tablet] 1 tab PO BID PRN #10 tablet


 PRN Reason: 


Ondansetron [Zofran Odt 4 mg Tablet] 1 - 2 tab PO Q4H PRN #15 tab.rapdis


 PRN Reason: For Nausea/Vomiting


Forms:  Elevated Blood Pressure

## 2017-06-21 NOTE — ER DOCUMENT REPORT
ED Medical Screen (RME)





- General


Chief Complaint: Abdominal Pain


Stated Complaint: STOMACH PAIN


Time Seen by Provider: 06/21/17 21:20


Notes: 


26-year-old female, has 2 complaints.  First is about 3 days of generalized 

abdominal pain, worst in the lower abdomen, also radiates to flank on both 

sides occasionally, associated vomiting and fever/chills yesterday, no vomiting 

today.  Patient also states she has an abscess in her left axillary area which 

had some drainage but is now getting harder, she has had drained abscesses in 

the past.  P last month.  She denies any daily medications.


TRAVEL OUTSIDE OF THE [place] IN LAST 30 DAYS: No





- Related Data


Allergies/Adverse Reactions: 


 





No Known Allergies Allergy (Verified 06/05/17 10:46)


 











Past Medical History


Pulmonary Medical History: Reports: Hx Bronchitis, Hx Pneumonia


Renal/ Medical History: Denies: Hx Peritoneal Dialysis


Past Surgical History: Reports: Hx Tonsillectomy





- Immunizations


Hx Diphtheria, Pertussis, Tetanus Vaccination: No - declined





Physical Exam





- Vital signs


Vitals: 





 











Temp Pulse Resp BP Pulse Ox


 


 98.5 F   84   18   126/87 H  100 


 


 06/21/17 19:18  06/21/17 19:18  06/21/17 19:18  06/21/17 19:18  06/21/17 19:18














- Abdominal


Tenderness: Tender - generalized non-specific tenderness





- Skin


Skin irregularity: Abscess - indurated abscess with dried discharge over the 

head in left axilla





Course





- Vital Signs


Vital signs: 





 











Temp Pulse Resp BP Pulse Ox


 


 98.5 F   84   18   126/87 H  100 


 


 06/21/17 19:18  06/21/17 19:18  06/21/17 19:18  06/21/17 19:18  06/21/17 19:18

## 2017-06-22 VITALS — SYSTOLIC BLOOD PRESSURE: 128 MMHG | DIASTOLIC BLOOD PRESSURE: 66 MMHG

## 2017-06-22 NOTE — RADIOLOGY REPORT (SQ)
EXAM DESCRIPTION:  U/S ABDOMEN COMPLETE W/O DOP



COMPLETED DATE/TIME:  6/22/2017 12:57 am



REASON FOR STUDY:  pelvic/generalized abdominal pain



COMPARISON:  None.



TECHNIQUE:  Dynamic and static grayscale images acquired of the abdomen and recorded on PACS. Additio
nal selected color Doppler and spectral images recorded.



LIMITATIONS:  None.



FINDINGS:  PANCREAS: No masses. Visualized pancreatic duct normal caliber.

LIVER: No masses. Echotexture normal.

LIVER VASCULATURE: Normal directional flow of the main portal vein and hepatic veins.

GALLBLADDER: No stones. Normal wall thickness. No pericholecystic fluid.

ULTRASOUND-DETECTED ALAS'S SIGN: Negative.

INTRAHEPATIC DUCTS AND COMMON DUCT: 0.3 cm diameter CBD and intrahepatic ducts normal caliber. No beltran
ling defects.

INFERIOR VENA CAVA: Normal flow.

AORTA: No aneurysm.

RIGHT KIDNEY:  Normal size.   Normal echogenicity.   No solid or suspicious masses.   No hydronephros
is.   No calcifications.

LEFT KIDNEY:  Normal size.   Normal echogenicity.   No solid or suspicious masses.   No hydronephrosi
s.   No calcifications.

SPLEEN: Normal size. No solid masses.

PERITONEAL AND PLEURAL SPACES: No ascites or effusions.

OTHER: No other significant finding.



IMPRESSION:  NORMAL ABDOMINAL ULTRASOUND.



TECHNICAL DOCUMENTATION:  JOB ID:  [id number]

 2011 [employer]- All Rights Reserved

## 2018-05-01 ENCOUNTER — HOSPITAL ENCOUNTER (EMERGENCY)
Dept: HOSPITAL 62 - ER | Age: 28
Discharge: HOME | End: 2018-05-01
Payer: COMMERCIAL

## 2018-05-01 VITALS — DIASTOLIC BLOOD PRESSURE: 72 MMHG | SYSTOLIC BLOOD PRESSURE: 125 MMHG

## 2018-05-01 DIAGNOSIS — F17.200: ICD-10-CM
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
## 2018-05-01 DIAGNOSIS — M25.561: ICD-10-CM
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
## 2018-05-01 DIAGNOSIS — S80.01XA: Primary | ICD-10-CM

## 2018-05-01 PROCEDURE — 99283 EMERGENCY DEPT VISIT LOW MDM: CPT

## 2018-05-01 PROCEDURE — L1830 KO IMMOB CANVAS LONG PRE OTS: HCPCS

## 2018-05-01 PROCEDURE — 73564 X-RAY EXAM KNEE 4 OR MORE: CPT

## 2018-05-01 NOTE — ER DOCUMENT REPORT
HPI





- HPI


Patient complains to provider of: slipped and fell onto both knees


Onset: Yesterday - on cement block


Quality of pain: Achy


Pain Level: 4


Context: 





28 yo female tripped and fell onto both bent knees yesterday. Left recovered 

but the right knee hurts to bend it, had trouble sleeping due to pain. Work 

"[employer] worked today, limped all day.


Associated Symptoms: Chest pain


Exacerbated by: Walking


Relieved by: Denies


Similar symptoms previously: No


Recently seen / treated by doctor: No





- ROS


ROS below otherwise negative: Yes


Systems Reviewed and Negative: Yes All other systems reviewed and negative





- REPRODUCTIVE


Reproductive: DENIES: Pregnant:





Past Medical History





- General


Information source: Patient





- Social History


Smoking Status: Current Some Day Smoker


Frequency of alcohol use: Occasional


Drug Abuse: None


Occupation: [employer]


Lives with: Family


Family History: Arthritis, CAD, CVA, DM, Hyperlipidemia, Hypertension, 

Malignancy, Thyroid Disfunction


Pulmonary Medical History: Reports: Hx Bronchitis, Hx Pneumonia


Renal/ Medical History: Denies: Hx Peritoneal Dialysis


Past Surgical History: Reports: Hx Tonsillectomy





- Immunizations


Hx Diphtheria, Pertussis, Tetanus Vaccination: No - declined





Vertical Provider Document





- CONSTITUTIONAL


Agree With Documented VS: Yes


Exam Limitations: No Limitations


General Appearance: No Apparent Distress





- INFECTION CONTROL


TRAVEL OUTSIDE OF THE [place] IN LAST 30 DAYS: No





- HEENT


HEENT: Normocephalic





- NECK


Neck: Supple





- MUSCULOSKELETAL/EXTREMETIES


Musculoskeletal/Extremeties: Tender - anterior bruised right knee, patellar 

tendon intact, hurts to flex it., Edema, Eccymosis


Notes: 





no effusion





- NEURO


Level of Consciousness: Awake, Alert





- DERM


Integumentary: Warm, Dry





Course





- Re-evaluation


Re-evalutation: 





05/01/18 18:42


radiologist said possible stress fracture  distal femur, consult dr morris, knee 

immobilizer, crutches, send to ortho





Procedures





- Immobilization


  ** Right Knee


Time completed: 18:46


Pre-Proc Neuro Vasc Exam: Normal


Immobilizer type: Crutches, Knee immobilizer


Performed by: PCT


Post-Proc Neuro Vasc Exam: Normal


Alignment checked and good: Yes





Discharge





- Discharge


Clinical Impression: 


 right knee contusion, possible femur stress fracture





Condition: Good


Disposition: HOME, SELF-CARE


Instructions:  Use of Crutches ([facility]), Knee Immobilizing Splint ([facility]), Fracture (

[facility])


Additional Instructions: 


radiologist suggested possible distal left femur stress fracture


use the knee immobilizer and crutches


call for orthopedic appt this week for follow up


motrin for pain


Prescriptions: 


Ibuprofen [Motrin 600 mg Tablet] 600 mg PO Q8HP PRN #30 tablet


 PRN Reason: 


Referrals: 


AARON DILLON MD [ACTIVE STAFF] - Follow up tomorrow

## 2018-05-01 NOTE — RADIOLOGY REPORT (SQ)
EXAM DESCRIPTION:  KNEE RIGHT 4 VIEWS



COMPLETED DATE/TIME:  5/1/2018 6:00 pm



REASON FOR STUDY:  pain, injury



COMPARISON:  None.



NUMBER OF VIEWS:  Four views.



TECHNIQUE:  AP, lateral, and both oblique radiographic images acquired of the right knee.



LIMITATIONS:  None.



FINDINGS:  MINERALIZATION: Normal.

BONES: No acute fracture or dislocation.  There is a focal area of cortical thickening along the medi
al cortex of the distal femoral diaphysis and the possibility of a stress fracture should be consider
ed.  Clinical correlation is recommended

JOINT: No effusion.

SOFT TISSUES: No soft tissue swelling.  No radio-opaque foreign body.

OTHER: No other significant finding.



IMPRESSION:  No acute fracture dislocation.  There is a focal area of cortical thickening along the m
edial cortex of the distal femoral diaphysis as noted above and the possibility of a stress fracture 
should be considered.  Clinical correlation is recommended.  Other findings as noted above.



TECHNICAL DOCUMENTATION:  JOB ID:  [id number]

 2011 [employer]- All Rights Reserved



Reading location - IP/workstation name: [email or web address]

## 2018-06-10 ENCOUNTER — HOSPITAL ENCOUNTER (EMERGENCY)
Dept: HOSPITAL 62 - ER | Age: 28
Discharge: HOME | End: 2018-06-10
Payer: COMMERCIAL

## 2018-06-10 VITALS — DIASTOLIC BLOOD PRESSURE: 80 MMHG | SYSTOLIC BLOOD PRESSURE: 125 MMHG

## 2018-06-10 DIAGNOSIS — L50.9: ICD-10-CM

## 2018-06-10 DIAGNOSIS — R59.0: ICD-10-CM

## 2018-06-10 DIAGNOSIS — R23.8: ICD-10-CM

## 2018-06-10 DIAGNOSIS — K12.1: Primary | ICD-10-CM

## 2018-06-10 PROCEDURE — 99282 EMERGENCY DEPT VISIT SF MDM: CPT

## 2018-06-10 NOTE — ER DOCUMENT REPORT
HPI





- HPI


Patient complains to provider of: Sore throat


Onset: Other - 1 month


Quality of pain: Sharp


Pain Level: 5


Context: 





27-year-old female is complaining of a sore in her mouth for a month.  She went 

to the dentist and he called it an ulcer but is scheduled for an appointment on 

June 19 with an oral surgeon to have a biopsied.  They also found some lymph 

nodes under her chin and told her to come to the emergency room to be checked.  

The second thing she is complaining of is a rash that comes and goes.  No fever 

or chills.  No diabetes HIV or immune system problems.


Associated Symptoms: None


Exacerbated by: Denies


Relieved by: Denies


Similar symptoms previously: Yes


Recently seen / treated by doctor: Yes





- ROS


ROS below otherwise negative: Yes


Systems Reviewed and Negative: Yes All other systems reviewed and negative





- REPRODUCTIVE


Reproductive: DENIES: Pregnant:





Past Medical History





- General


Information source: Patient





- Social History


Smoking Status: Unknown if Ever Smoked


Frequency of alcohol use: None


Drug Abuse: None


Lives with: Family


Family History: Arthritis, CAD, CVA, DM, Hyperlipidemia, Hypertension, 

Malignancy, Thyroid Disfunction


Pulmonary Medical History: Reports: Hx Bronchitis, Hx Pneumonia


Renal/ Medical History: Denies: Hx Peritoneal Dialysis


Past Surgical History: Reports: None, Hx Tonsillectomy





- Immunizations


Hx Diphtheria, Pertussis, Tetanus Vaccination: No - declined





Vertical Provider Document





- CONSTITUTIONAL


Agree With Documented VS: Yes


Exam Limitations: No Limitations


General Appearance: No Apparent Distress





- INFECTION CONTROL


TRAVEL OUTSIDE OF THE [place] IN LAST 30 DAYS: No





- HEENT


HEENT: Normocephalic.  negative: Conjuctival Injection, Pharyngeal Erythema





- NECK


Neck: Supple


Notes: 





Small submental lymph nodes, a few small pimples on her chin, white ulceration 

behind second molar in the lower right.





- RESPIRATORY


Respiratory: Breath Sounds Normal, No Respiratory Distress





- CARDIOVASCULAR


Cardiovascular: Regular Rate, Regular Rhythm





- NEURO


Level of Consciousness: Awake





- DERM


Integumentary: Rash - Diffuse mild urticaria





Course





- Vital Signs


Vital signs: 


 











Temp Pulse Resp BP Pulse Ox


 


 99.4 F   85   16   125/80   99 


 


 06/10/18 10:39  06/10/18 10:39  06/10/18 10:39  06/10/18 10:39  06/10/18 10:39














Discharge





- Discharge


Clinical Impression: 


 Oral ulcer, Urticaria, Submental adenopathy





Condition: Good


Disposition: HOME, SELF-CARE


Instructions:  Acid-Suppressing Medication ([facility]), Acute Urticaria ([facility]), Use of 

Diphenhydramine, Sore Throat ([facility]), Ulcer ([facility])


Additional Instructions: 


See the oral surgeon on June 19 for a biopsy of the oral ulcer as planned


Magic mouthwash for the discomfort


Over-the-counter Benadryl 50 mg every 4-6 h for rash and over-the-counter 

Pepcid 0mg per day for the hives


See the otologist if the rash persists


Prescriptions: 


Nystatin/Dexameth/Diphen [Magic Mouthwash ([facility] Formula) Susp] 5 ml PO QID #120 

ml


Referrals: 


EDI CHAU DO [ACTIVE STAFF] - Follow up as needed

## 2018-06-12 ENCOUNTER — HOSPITAL ENCOUNTER (OUTPATIENT)
Dept: HOSPITAL 62 - RAD | Age: 28
End: 2018-06-12
Attending: ORTHOPAEDIC SURGERY
Payer: COMMERCIAL

## 2018-06-12 DIAGNOSIS — S83.241A: ICD-10-CM
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
## 2018-06-12 DIAGNOSIS — M25.561: Primary | ICD-10-CM

## 2018-06-13 NOTE — RADIOLOGY REPORT (SQ)
EXAM DESCRIPTION:  MRI RT LOWER JOINT WITHOUT



COMPLETED DATE/TIME:  6/12/2018 6:31 pm



REASON FOR STUDY:  M25.561 PAIN IN RIGHT KNEE M25.561  PAIN IN RIGHT KNEE



COMPARISON:  None.



TECHNIQUE:  Rightknee images acquired and stored on PACS.  Multiplanar images include fat sensitive s
equences as T1, water sensitive sequences as FST2 or STIR, cartilage sensitive sequences as FSPD, and
 gradient echo sequences.



LIMITATIONS:  None.



FINDINGS:  JOINT AND BURSAE: No effusion.

BONE CORTEX AND MARROW: No alteration of signal to suggest marrow replacement. No worrisome bone lesi
ons. No occult fracture.

ACL: Intact. No degeneration or ganglion cyst.

PCL: Intact.

MCL: Intact. No periligamentous edema or fluid.

LCL: Intact. No periligamentous edema or fluid.

MEDIAL MENISCUS: Small undersurface tear posterior horn medial meniscus, best shown on coronal image 
17 and sagittal image 19.  No parameniscal cysts.

LATERAL MENISCUS: No tears. No abnormal signal.

MEDIAL COMPARTMENT: Cartilage preserved. No bone bruises or reactive marrow edema. No osteophytes.

LATERAL COMPARTMENT: Cartilage preserved. No bone bruises or reactive marrow edema. No osteophytes.

PATELLA: No chondromalacia. No subchondral cysts. Medial and lateral retinacula intact.

EXTENSOR MECHANISM: Intact. Quadriceps and patella tendons normal.

SOFT TISSUES: Adjacent muscles and subcutaneous tissues normal.  Normal flow void in popliteal artery
 and vein.

OTHER: No other significant finding.



IMPRESSION:  Small undersurface tear, posterior horn medial meniscus.



TECHNICAL DOCUMENTATION:  JOB ID:  [id number]

 2011 [employer]- All Rights Reserved



Reading location - IP/workstation name: [facility]

## 2018-08-02 ENCOUNTER — HOSPITAL ENCOUNTER (EMERGENCY)
Dept: HOSPITAL 62 - ER | Age: 28
Discharge: HOME | End: 2018-08-02
Payer: COMMERCIAL

## 2018-08-02 VITALS — DIASTOLIC BLOOD PRESSURE: 81 MMHG | SYSTOLIC BLOOD PRESSURE: 131 MMHG

## 2018-08-02 DIAGNOSIS — M72.2: Primary | ICD-10-CM

## 2018-08-02 DIAGNOSIS — L53.9: ICD-10-CM

## 2018-08-02 PROCEDURE — 99283 EMERGENCY DEPT VISIT LOW MDM: CPT

## 2018-08-02 NOTE — ER DOCUMENT REPORT
HPI





- HPI


Patient complains to provider of: right plantar foot pain


Onset: Yesterday


Pain Level: 5


Context: 





27-year-old female complaining of plantar left foot pain that started 

yesterday.  There is also some swelling.  When she walks on her heels she had 

swelling in the forefoot and then when she started walking on her toes today 

she had swelling in the heel.  Is also an area of erythema in the midfoot that 

is not due to a shoe and she states it was bluish green yesterday and now red 

today.  Fever or chills.


Associated Symptoms: None


Exacerbated by: Walking


Relieved by: Denies


Similar symptoms previously: No


Recently seen / treated by doctor: No





- ROS


ROS below otherwise negative: Yes


Systems Reviewed and Negative: Yes All other systems reviewed and negative





- REPRODUCTIVE


Reproductive: DENIES: Pregnant:





- MUSCULOSKELETAL


Musculoskeletal: REPORTS: Extremity pain





Past Medical History





- General


Information source: Patient





- Social History


Smoking Status: Never Smoker


Frequency of alcohol use: None


Drug Abuse: None


Lives with: Family


Family History: Arthritis, CAD, CVA, DM, Hyperlipidemia, Hypertension, 

Malignancy, Thyroid Disfunction


Patient has suicidal ideation: No


Patient has homicidal ideation: No


Pulmonary Medical History: Reports: Hx Bronchitis, Hx Pneumonia


Renal/ Medical History: Denies: Hx Peritoneal Dialysis


Past Surgical History: Reports: Hx Tonsillectomy





- Immunizations


Hx Diphtheria, Pertussis, Tetanus Vaccination: No - declined





Vertical Provider Document





- CONSTITUTIONAL


Agree With Documented VS: Yes


Exam Limitations: No Limitations





- INFECTION CONTROL


TRAVEL OUTSIDE OF THE [place] IN LAST 30 DAYS: No





- MUSCULOSKELETAL/EXTREMETIES


Musculoskeletal/Extremeties: MAEW, FROM, Tender - platar fascia, 2+ DP, macular 

pink 2.5 cm area mid arch with 6 cm linear ascending possible lyphangitis, 

Edema - plantar left foot,





- NEURO


Level of Consciousness: Alert


Motor/Sensory: No Motor Deficit, No Sensory Deficit





Course





- Re-evaluation


Re-evalutation: 





08/02/18 18:33


pt denies pregnancy.  No known allergies, the left foot x-ray is negative.  We 

have marks the red area and gone over the instructions with her and she agrees 

and understands.





- Vital Signs


Vital signs: 


 











Temp Pulse Resp BP Pulse Ox


 


 98.7 F   97   16   131/81 H  99 


 


 08/02/18 17:09  08/02/18 17:09  08/02/18 17:09  08/02/18 17:09  08/02/18 17:09














Discharge





- Discharge


Clinical Impression: 


 Left plantar Fasciitis, Foot erythema





Condition: Good


Disposition: HOME, SELF-CARE


Instructions:  Acetaminophen, Cephalexin ([facility]), Ibuprofen (General) ([facility]), 

Plantar Fasciitis or Heel Spur ([facility]), Tendonitis ([facility])


Additional Instructions: 


Cool compress to the foot


Antibiotics


You need to return if the redness does not go away or goes higher than the 

purple marking


Tylenol


Motrin


Crutches


See the podiatrist if it persists


Prescriptions: 


Ibuprofen [Motrin 600 mg Tablet] 600 mg PO Q8HP PRN #30 tablet


 PRN Reason: 


Cephalexin Monohydrate [Keflex 500 mg Capsule] 500 mg PO QID #28 capsule


Forms:  Return to Work


Referrals: 


AARON DILLON MD [Primary Care Provider] - Follow up as needed

## 2018-08-02 NOTE — RADIOLOGY REPORT (SQ)
EXAM DESCRIPTION:  FOOT LEFT COMPLETE



COMPLETED DATE/TIME:  8/2/2018 5:54 pm



REASON FOR STUDY:  L foot pain



COMPARISON:  None.



NUMBER OF VIEWS:  Three views.



TECHNIQUE:  AP, lateral and oblique  radiographic images acquired of the left foot.



LIMITATIONS:  None.



FINDINGS:  MINERALIZATION: Normal.

BONES: No acute fracture or dislocation.  No worrisome bone lesions.

JOINTS: No effusions.

SOFT TISSUES: No soft tissue swelling.  No foreign body.

OTHER: No other significant finding.



IMPRESSION:  NEGATIVE STUDY OF THE LEFT FOOT. NO RADIOGRAPHIC EVIDENCE OF ACUTE INJURY.



TECHNICAL DOCUMENTATION:  JOB ID:  [id number]

 2011 [employer]- All Rights Reserved



Reading location - IP/workstation name: [email or web address]

## 2018-08-02 NOTE — ER DOCUMENT REPORT
ED Medical Screen (RME)





- General


Chief Complaint: Foot Pain


Stated Complaint: foot pain


Time Seen by Provider: 08/02/18 17:27


Mode of Arrival: Ambulatory


Information source: Patient


TRAVEL OUTSIDE OF THE [place] IN LAST 30 DAYS: No





- HPI


Patient complains to provider of: L foot pain


Onset: This morning - pt with c/o L foot pain starting earlier today.  No trauma





- Related Data


Allergies/Adverse Reactions: 


 





No Known Allergies Allergy (Verified 08/02/18 17:26)


 











Past Medical History


Pulmonary Medical History: Reports: Hx Bronchitis, Hx Pneumonia


Renal/ Medical History: Denies: Hx Peritoneal Dialysis


Past Surgical History: Reports: Hx Tonsillectomy





- Immunizations


Hx Diphtheria, Pertussis, Tetanus Vaccination: No - declined





Physical Exam





- Vital signs


Vitals: 





 











Temp Pulse Resp BP Pulse Ox


 


 98.7 F   97   16   131/81 H  99 


 


 08/02/18 17:09  08/02/18 17:09  08/02/18 17:09  08/02/18 17:09  08/02/18 17:09














Course





- Vital Signs


Vital signs: 





 











Temp Pulse Resp BP Pulse Ox


 


 98.7 F   97   16   131/81 H  99 


 


 08/02/18 17:09  08/02/18 17:09  08/02/18 17:09  08/02/18 17:09  08/02/18 17:09














Doctor's Discharge





- Discharge


Referrals: 


AARON DILLON MD [Primary Care Provider] - Follow up as needed

## 2018-09-06 ENCOUNTER — HOSPITAL ENCOUNTER (EMERGENCY)
Dept: HOSPITAL 62 - ER | Age: 28
Discharge: HOME | End: 2018-09-06
Payer: COMMERCIAL

## 2018-09-06 VITALS — DIASTOLIC BLOOD PRESSURE: 78 MMHG | SYSTOLIC BLOOD PRESSURE: 113 MMHG

## 2018-09-06 DIAGNOSIS — Z86.14: ICD-10-CM

## 2018-09-06 DIAGNOSIS — L02.415: Primary | ICD-10-CM

## 2018-09-06 PROCEDURE — 87186 SC STD MICRODIL/AGAR DIL: CPT

## 2018-09-06 PROCEDURE — 87077 CULTURE AEROBIC IDENTIFY: CPT

## 2018-09-06 PROCEDURE — 99283 EMERGENCY DEPT VISIT LOW MDM: CPT

## 2018-09-06 PROCEDURE — 87070 CULTURE OTHR SPECIMN AEROBIC: CPT

## 2018-09-06 PROCEDURE — 87205 SMEAR GRAM STAIN: CPT

## 2018-09-06 NOTE — ER DOCUMENT REPORT
ED Skin Rash/Insect Bite/Abscs





- General


Chief Complaint: Abscess


Stated Complaint: POSSIBLE SPIDER BITE


Time Seen by Provider: 09/06/18 09:05


Mode of Arrival: Ambulatory


Information source: Patient


Notes: 





27-year-old female presents to ED for an abscess to the right hip.  She states 

it has been there for 2 weeks and she has not seen a doctor yet.  Patient is 

alert and oriented respirations regular and unlabored states she has a history 

of MRSA.  She is able to walk with a limp.


TRAVEL OUTSIDE OF THE [place] IN LAST 30 DAYS: No





- HPI


Onset: Other - 2 weeks


Onset/Duration: Gradual, Persistent, Worse


Quality of pain: Sharp, Stabbing


Severity: Severe


Pain Level: 5


Skin Character: Abscess


Quality of rash: Painful


Exacerbated by: Walking


Relieved by: Denies


Similar symptoms previously: Yes


Recently seen / treated by doctor: No





- Related Data


Allergies/Adverse Reactions: 


 





No Known Allergies Allergy (Verified 08/02/18 17:26)


 











Past Medical History





- General


Information source: Patient





- Social History


Smoking Status: Never Smoker


Cigarette use (# per day): No


Chew tobacco use (# tins/day): No


Smoking Education Provided: No


Frequency of alcohol use: Social


Drug Abuse: None


Occupation:  


Lives with: Alone - kids


Family History: Arthritis, CAD, CVA, DM, Hyperlipidemia, Hypertension, 

Malignancy, Thyroid Disfunction


Patient has suicidal ideation: No


Patient has homicidal ideation: No





- Past Medical History


Cardiac Medical History: Reports: None


Pulmonary Medical History: Reports: Hx Bronchitis, Hx Pneumonia


EENT Medical History: Reports: None


Neurological Medical History: Reports: None


Endocrine Medical History: Reports: None


Renal/ Medical History: Reports: None


Malignancy Medical History: Reports: None


Musculoskeletal Medical History: Reports None


Skin Medical History: Reports Hx MRSA


Psychiatric Medical History: Reports: None


Traumatic Medical History: Reports: None


Infectious Medical History: Reports: Hx MRSA


Past Surgical History: Reports: Hx Tonsillectomy





- Immunizations


Hx Diphtheria, Pertussis, Tetanus Vaccination: No - declined





Review of Systems





- Review of Systems


Constitutional: No symptoms reported


EENT: No symptoms reported


Cardiovascular: No symptoms reported


Respiratory: No symptoms reported


Gastrointestinal: No symptoms reported


Genitourinary: No symptoms reported


Female Genitourinary: No symptoms reported


Musculoskeletal: No symptoms reported


Skin: No symptoms reported


Hematologic/Lymphatic: No symptoms reported


Neurological/Psychological: No symptoms reported





Physical Exam





- Vital signs


Vitals: 


 











Temp Pulse Resp BP Pulse Ox


 


 98.1 F   85   16   113/78   100 


 


 09/06/18 09:18  09/06/18 09:18  09/06/18 09:18  09/06/18 09:18  09/06/18 09:18











Interpretation: Normal





- General


General appearance: Appears well, Alert





- HEENT


Head: Normocephalic, Atraumatic


Eyes: Normal


Pupils: PERRL





- Respiratory


Respiratory status: No respiratory distress


Chest status: Nontender


Breath sounds: Normal


Chest palpation: Normal





- Cardiovascular


Rhythm: Regular


Heart sounds: Normal auscultation


Murmur: No





- Abdominal


Inspection: Normal


Distension: No distension


Bowel sounds: Normal


Tenderness: Nontender


Organomegaly: No organomegaly





- Back


Back: Normal, Nontender





- Extremities


General upper extremity: Normal inspection, Nontender, Normal color, Normal ROM

, Normal temperature


General lower extremity: Normal color, Normal ROM, Normal temperature, Normal 

weight bearing.  No: Hollis's sign


Thigh: Tender, Other - abscess





- Neurological


Neuro grossly intact: Yes


Cognition: Normal


Orientation: AAOx4


[place] Coma Scale Eye Opening: Spontaneous


[place] Coma Scale Verbal: Oriented


[place] Coma Scale Motor: Obeys Commands


[place] Coma Scale Total: 15


Speech: Normal


Motor strength normal: LUE, RUE, LLE, RLE


Sensory: Normal





- Psychological


Associated symptoms: Normal affect, Normal mood





- Skin


Skin Temperature: Warm


Skin Moisture: Dry


Skin Color: Normal


Skin irregularity: Abscess


Location of irregularity: Other - right thigh


Irregularity with: Swelling, Tenderness, Warmth





Course





- Re-evaluation


Re-evalutation: 





09/06/18 09:41


Treated with Norco Bactrim and Keflex and discharged home with Norco Bactrim 

and Keflex prescriptions.  Patient to follow-up with her primary doctor.





- Vital Signs


Vital signs: 


 











Temp Pulse Resp BP Pulse Ox


 


 98.1 F   85   16   113/78   100 


 


 09/06/18 09:18  09/06/18 09:18  09/06/18 09:18  09/06/18 09:18  09/06/18 09:18














Procedures





- Incision and Drainage


  ** Right Hip


Time completed: 09:31


Type: Simple


Anesthetic type: 1% Lidocaine


mL's of anesthetic: 5


Blade size: 11


I&D procedure: Shurclens applied, Sterile dressing applied


Incision Method: Incision made by scalpel


Amount/type of drainage: moderate amount purulent drainage





Discharge





- Discharge


Clinical Impression: 


 abscess right hip superficial





Condition: Stable


Disposition: HOME, SELF-CARE


Instructions:  Family Physicians / Practices


Additional Instructions: 


ABSCESS:





     You have an abscess (boil).  This a pus-forming infection, usually due to 

staph.  Some boils may be left to drain on their own, but most require lancing.


     From the time the tender lump first appears, it may be three or four days 

before the abscess is ready to yeni.  Local heat and rest help at this stage 

of treatment.  An antibiotic may prevent spread of the infection.


     Once the abscess is opened, packing may be placed into it.  This is done 

so pus is not sealed inside by premature closure of the cavity. The packing 

will be removed at your follow-up visit or you may be advised to remove it 

yourself at home.  Sometimes this packing must be replaced a few times during 

healing.


     The wound will heal with surprisingly little scar.  Depending on the size 

and location of an abscess, healing can take one to four weeks.


     You may shower and wash the area around the incision site two or three 

times a day.


     Antibiotics may be prescribed, but are usually not necessary after an 

abscess has been drained.


     If you develop fever, chills, worsening pain, or increasing swelling in 

the area, call the doctor or return immediately.








POST INCISION AND DRAINAGE:


     You have had an incision made to allow drainage of an abscess. The 

incision must remain open so that pus and debris can drain from the wound.


     If the abscess cavity is large, packing is placed.  This keeps the tissues 

from collapsing and trapping pus inside, while the body shrinks the cavity.  

The packing may need to be replaced every day or two.  The physician will 

instruct you on the packing.


     Keep a bulky dressing over the area.  Replace it if it becomes saturated 

with blood or pus.  Do not disturb the packing (if present).


     You may shower and cleanse the area with gentle soap and warm water two or 

three times a day.  Local warmth may be soothing, and may promote faster 

healing.


     Return if you develop high fever or chills, or if you note spreading 

redness, increasing swelling, or increasing tenderness.








MRSA CELLULITIS:


     You have an infection of your skin and underlying soft tissues called 

cellulitis.  This is due to bacteria, which can enter through any break in the 

skin, or even through an irritated hair follicle.  Untreated, cellulitis will 

usually worsen and may form an abscess which requires draining.


     Although many bacterial organisms can cause cellulitis and abscess 

formations, the most likely bacteria is Methicillin-Resistant Staph Aureus, or 

MRSA for short.  Antibiotics are required.  Usually, warm packs or warm soaks, 

and elevation of the infected area are recommended.  You should start getting 

better within 24 to 36 hours.


     Most infections respond quickly to the right medication. Follow-up care is 

important, however, to check for abscess (boil) formation, unsuspected foreign 

body, or resistant infection.


     If you develop fever, chills, or if the area of infection is becoming 

rapidly more swollen or painful, call the doctor at once.








ORAL NARCOTIC MEDICATION:


     You have been given a prescription for pain control.  This medication is a 

narcotic.  It's best taken with food, as nausea can result if taken on an empty 

stomach.


     Don't operate machinery or drive within six hours of taking this 

medication.  Do not combine this medicine with alcohol, or with any medication 

which can cause sedation (such as cold tablets or sleeping pills) unless you 

get permission from the physician.


     Narcotics tend to cause constipation.  If possible, drink plenty of fluids 

and eat a diet high in fiber and fruits.








CEPHALEXIN:


     The antibiotic you've been prescribed is a member of the cephalosporin 

class.  This type of antibiotic covers a wide variety of infections, including 

those of the skin, lungs, and urinary tract. It's useful for staph infections.


     This antibiotic is slightly similar to the penicillin family. In rare cases

, a person who is allergic to penicillin will also be allergic to this 

medication.  If you have had a severe allergic reaction to penicillin, and have 

not taken this antibiotic since that time, notify your doctor.


     Antibiotics which cover many germs ("broad spectrum" antibiotics) are more 

likely to cause diarrhea or "yeast" infections.  Women prone to vaginal yeast 

problems may suffer an attack after taking this antibiotic.  In infants, oral 

thrush (white spots "stuck" on the cheek) or yeast diaper rash may result.  See 

your doctor if these problems occur.  Call at once if you develop itching, hives

, shortness of breath, or lightheadedness.








TRIMETHOPRIM-SULFA:


     You have been given a prescription for trimethoprim-sulfa (TMS, Septra, 

Bactrim).  This is a combination antibiotic of the sulfa class, often used for 

urinary tract infections, middle ear infections, bronchitis, shigella 

intestinal infection, and Pneumocystis pneumonia.


     TMS is usually well-tolerated.  Occasional side effects include nausea and 

decreased appetite.  Septra is not recommended for infants less than two months 

of age.  Do not take this medication if you have experienced severe side 

effects or allergy to sulfa medicine.


     You should stop this medicine at once and contact your physician if you 

develop any rash, joint pain, shortness of breath, bruising, or jaundice (

yellow color in the skin), or if you develop any other new or unusual symptoms.





Epsom Salt Soaks





     Soak the wound area in a container of warm epsom salt water.  If you can't 

get the wound area into a bucket or pan, use a folded towel soaked in the epsom 

salt solution and apply to the area.


     Use clean hot tap water (about the temperature of a very warm bath), 

mixing in about one (1) teaspoon for every pint of water.


           Two gallon --> 16 teaspoons Epsom Salts


           One gallon -->  8 teaspoons Epsom Salts


           Two quarts -->  4 teaspoons Epsom Salts


           One quart  -->  2 teaspoons Epsom Salts


     Soak the wound for about 20 minutes while gently moving it around in the 

water.  Repeat this four (4) times a day.





FOLLOW-UP CARE:


Most simple abscesses will not require a follow up visit.   If you had packing 

placed in the abscess, remove it as instructed by the physician.  If you have 

been referred to a physician for follow-up care, call the physicians office 

for an appointment as you were instructed or within the next two days.  If you 

experience worsening or a significant change in your symptoms, return to the 

Emergency Department at any time for re-evaluation.





Prescriptions: 


Hydrocodone/Acetaminophen [Norco 5-325 mg Tablet] 1 tab PO Q6HP PRN #5 tablet


 PRN Reason: 


Cephalexin Monohydrate [Keflex 500 mg Capsule] 500 mg PO Q6H 5 Days  capsule


Sulfamethoxazole/Trimethoprim [Bactrim Ds Tablet] 1 each PO BID #20 tablet


Forms:  Return to Work


Referrals: 


AARON DILLON MD [Primary Care Provider] - Follow up as needed

## 2019-09-16 NOTE — ER DOCUMENT REPORT
ED General





- General


Chief Complaint: Headache


Stated Complaint: HEADACHE,NECK PAIN


Time Seen by Provider: 09/16/19 10:21


Mode of Arrival: Ambulatory


Notes: 





28 year old nontoxic female arrives with compliants of headache for the last few

days which is worse today.  Subjective fever at home.  Child with strep + 

culture about a week ago.  No rash.  No back pain or stiff neck.  


TRAVEL OUTSIDE OF THE [place] IN LAST 30 DAYS: No





- HPI


Patient complains to provider of: headache


Onset: Yesterday


Onset/Duration: Gradual


Quality of pain: Achy, Throbbing


Severity: Moderate


Associated symptoms: Fever, Headache


Exacerbated by: Denies


Relieved by: Denies





- Related Data


Allergies/Adverse Reactions: 


                                        





No Known Allergies Allergy (Verified 09/16/19 09:35)


   











Past Medical History





- General


Information source: Patient





- Social History


Smoking Status: Current Every Day Smoker


Family History: Arthritis, CAD, CVA, DM, Hyperlipidemia, Hypertension, 

Malignancy, Thyroid Disfunction


Patient has suicidal ideation: No


Patient has homicidal ideation: No


Pulmonary Medical History: Reports: Hx Bronchitis, Hx Pneumonia


Renal/ Medical History: Denies: Hx Peritoneal Dialysis


Skin Medical History: Reports Hx MRSA


Infectious Medical History: Reports: Hx MRSA


Past Surgical History: Reports: Hx Tonsillectomy





- Immunizations


Hx Diphtheria, Pertussis, Tetanus Vaccination: No - declined





Review of Systems





- Review of Systems


Constitutional: No symptoms reported


EENT: No symptoms reported


Cardiovascular: No symptoms reported


Respiratory: No symptoms reported


Gastrointestinal: No symptoms reported


Genitourinary: No symptoms reported


Female Genitourinary: No symptoms reported


Musculoskeletal: No symptoms reported


Skin: No symptoms reported


Hematologic/Lymphatic: No symptoms reported


Neurological/Psychological: No symptoms reported





Physical Exam





- Vital signs


Vitals: 


                                        











Temp Pulse Resp BP Pulse Ox


 


 98.8 F   90   16   118/79   97 


 


 09/16/19 09:39  09/16/19 09:39  09/16/19 09:39  09/16/19 09:39  09/16/19 09:39











Interpretation: Normal





- General


General appearance: Appears well, Alert





- HEENT


Head: Normocephalic, Atraumatic, Other - right parietal ttp with out deformity.


Eyes: Normal


Pupils: PERRL





- Respiratory


Respiratory status: No respiratory distress


Chest status: Nontender


Breath sounds: Normal


Chest palpation: Normal





- Cardiovascular


Rhythm: Regular


Heart sounds: Normal auscultation


Murmur: No





- Abdominal


Inspection: Normal


Distension: No distension


Bowel sounds: Normal


Tenderness: Nontender


Organomegaly: No organomegaly





- Back


Back: Normal, Nontender





- Extremities


General upper extremity: Normal inspection, Nontender, Normal color, Normal ROM,

Normal temperature


General lower extremity: Normal inspection, Nontender, Normal color, Normal ROM,

Normal temperature, Normal weight bearing.  No: Hollis's sign





- Neurological


Neuro grossly intact: Yes


Cognition: Normal


Orientation: AAOx4


[place] Coma Scale Eye Opening: Spontaneous


[place] Coma Scale Verbal: Oriented


[place] Coma Scale Motor: Obeys Commands


[place] Coma Scale Total: 15


Speech: Normal


Sensory: Normal





- Psychological


Associated symptoms: Normal affect, Normal mood





- Skin


Skin Temperature: Warm


Skin Moisture: Dry


Skin Color: Normal





Course





- Re-evaluation


Re-evalutation: 





09/16/19 11:47


MDM 28 year old with headache that may be tension or migraine or possibly 

related to strep exposure.  No tick bite.  No concerning symptoms at this time. 

Will treat with amoxil and her headache is better.





- Vital Signs


Vital signs: 


                                        











Temp Pulse Resp BP Pulse Ox


 


 98.8 F   90   16   118/79   97 


 


 09/16/19 09:39  09/16/19 09:39  09/16/19 09:39  09/16/19 09:39  09/16/19 09:39














Discharge





- Discharge


Clinical Impression: 


Headache


Qualifiers:


 Headache type: unspecified Headache chronicity pattern: acute headache 

Intractability: not intractable Qualified Code(s): R51 - Headache





Condition: Good


Disposition: HOME, SELF-CARE


Instructions:  Headache ([facility])


Additional Instructions: 


See your doctor in followup.  Rest.  Please take the medicine as directed.  

Return here for any problems or any concerns. 


Prescriptions: 


Amoxicillin 1 tab PO TID #30 tab


Ibuprofen [Motrin 600 Mg Tablet] 600 mg PO TID #15 tablet

## 2019-09-16 NOTE — ER DOCUMENT REPORT
ED Medical Screen (RME)





- General


Chief Complaint: Headache


Stated Complaint: HEADACHE,NECK PAIN


Time Seen by Provider: 09/16/19 10:21


Mode of Arrival: Ambulatory


Information source: Patient


Notes: 





Patient is a 28-year-old female presenting with headache that began 2 days ago. 

She reports headache is located on the right side of her head, had a gradual 

onset.  She reports associated fevers and ear pain.  She denies history of 

migraines.  Exam:





Exam:


Bilateral TMs unremarkable.


No focal neurological deficits noted.





I have greeted and performed a rapid initial assessment of this patient.  A 

comprehensive ED assessment and evaluation of the patient, analysis of test 

results and completion of the medical decision making process will be conducted 

by additional ED providers.  I have specifically instructed the patient or 

family members with the patient to immediately return to any nursing staff 

should anything change in the patient's condition or with their chief complaint.





This medical record was dictated with voice recognizing software.  There may be 

grammatical, syntax errors that are unintended.





TRAVEL OUTSIDE OF THE [place] IN LAST 30 DAYS: No





- Related Data


Allergies/Adverse Reactions: 


                                        





No Known Allergies Allergy (Verified 09/16/19 09:35)


   











Past Medical History


Pulmonary Medical History: Reports: Hx Bronchitis, Hx Pneumonia


Renal/ Medical History: Denies: Hx Peritoneal Dialysis


Skin Medical History: Reports Hx MRSA


Infectious Medical History: Reports: Hx MRSA


Past Surgical History: Reports: Hx Tonsillectomy





- Immunizations


Hx Diphtheria, Pertussis, Tetanus Vaccination: No - declined





Physical Exam





- Vital signs


Vitals: 





                                        











Temp Pulse Resp BP Pulse Ox


 


 98.8 F   90   16   118/79   97 


 


 09/16/19 09:39  09/16/19 09:39  09/16/19 09:39  09/16/19 09:39  09/16/19 09:39














Course





- Vital Signs


Vital signs: 





                                        











Temp Pulse Resp BP Pulse Ox


 


 98.8 F   90   16   118/79   97 


 


 09/16/19 09:39  09/16/19 09:39  09/16/19 09:39  09/16/19 09:39  09/16/19 09:39

## 2020-01-19 NOTE — ER DOCUMENT REPORT
ED General





- General


Chief Complaint: Flu Symptoms


Stated Complaint: FLU LIKE SYMPTOMS


Time Seen by Provider: 01/19/20 08:00


Primary Care Provider: 


[facility] [Provider Group] - Follow up in 3-5 days


PAUL SALAZAR MD [COMMUNITY BASED STAFF] - Follow up in 3-5 days


Notes: 





29-year-old female presents with sore throat, subjective fever, chills, 

headache, body aches, nonproductive cough.  Patient states started off as a sore

throat for past few weeks and started to have the other symptoms yesterday.  

Patient states she has not taken any medication for this.  Patient denies any 

nausea/vomiting, abdominal pain, chest pain, coughing up anything.


TRAVEL OUTSIDE OF THE [place] IN LAST 30 DAYS: No





- Related Data


Allergies/Adverse Reactions: 


                                        





No Known Allergies Allergy (Verified 09/16/19 09:35)


   











Past Medical History





- Social History


Smoking Status: Never Smoker


Family History: Arthritis, CAD, CVA, DM, Hyperlipidemia, Hypertension, 

Malignancy, Thyroid Disfunction


Patient has suicidal ideation: No


Patient has homicidal ideation: No


Pulmonary Medical History: Reports: Hx Bronchitis, Hx Pneumonia


Renal/ Medical History: Denies: Hx Peritoneal Dialysis


Skin Medical History: Reports Hx MRSA


Infectious Medical History: Reports: Hx MRSA


Past Surgical History: Reports: Hx Tonsillectomy





- Immunizations


Hx Diphtheria, Pertussis, Tetanus Vaccination: No - declined





Review of Systems





- Review of Systems


Notes: 





Constitutional: Positive for subjective fever and chills.


HENT: Positive for sore throat and nonproductive cough.


Eyes: Negative for visual changes.


Cardiovascular: Negative for chest pain.


Respiratory: Negative for shortness of breath.


Gastrointestinal: Negative for abdominal pain, vomiting or diarrhea.


Genitourinary: Negative for dysuria.


Musculoskeletal: Positive for myalgias.  Negative for back pain.


Skin: Negative for rash.


Neurological: Positive for headaches.  Negative for weakness or numbness.





10 point ROS negative except as marked above and in HPI.





Physical Exam





- Vital signs


Vitals: 


                                        











Temp Pulse Resp BP Pulse Ox


 


 99.8 F   110 H  18   134/84 H  99 


 


 01/19/20 06:18  01/19/20 06:18  01/19/20 06:18  01/19/20 06:18  01/19/20 06:18














- Notes


Notes: 





GENERAL: Well-appearing, well-nourished and in no acute distress.


HEAD: Atraumatic, normocephalic.


EYES: Extraocular movements intact, sclera anicteric, conjunctiva are normal.


ENT: TMs normal, nares patent, oropharynx clear without exudates.  Moist mucous 

membranes.  Uvula midline without edema.  No muffled voice.  No trismus.  No 

PTA.


NECK: Normal range of motion, supple without lymphadenopathy or JVD.


LUNGS: Breath sounds clear to auscultation bilaterally and equal.  No wheezes 

rales or rhonchi.


HEART: Regular rate and rhythm without murmurs, rubs or gallops.


EXTREMITIES: Normal range of motion, no pitting or edema.  No clubbing or 

cyanosis.


NEUROLOGICAL: Cranial nerves II through XII grossly intact.  Normal speech, 

normal gait.


PSYCH: Normal mood, normal affect.


SKIN: Warm, Dry, normal turgor, no rashes or lesions noted.





Course





- Re-evaluation


Re-evalutation: 





01/19/20 nontoxic, well-appearing 29-year-old female presents with symptoms 

consistent with viral URI/flulike symptoms.  Patient is afebrile.  Heart rate 

has improved without intervention.  Uvula midline without edema.  No trismus.  

No muffled voice.  No PTA.  Patient given symptomatic relief with close follow-

up with PCP.  Strict return precautions given.  Patient voices understanding and

agrees with plan of care.





- Vital Signs


Vital signs: 


                                        











Temp Pulse Resp BP Pulse Ox


 


 98.7 F   104 H  18   135/81 H  100 


 


 01/19/20 08:21  01/19/20 08:21  01/19/20 08:21  01/19/20 08:21  01/19/20 08:21














Discharge





- Discharge


Clinical Impression: 


 Viral URI, Flu-like symptoms





Condition: Stable


Disposition: HOME, SELF-CARE


Instructions:  Upper Respiratory Illness ([facility])


Additional Instructions: 


Please take medications as prescribed.  Please drink plenty of fluids.  

Alternate Tylenol/Motrin every 3 hours, for example take Tylenol now 3 hours 

later take Motrin and then 3 hours after that take Tylenol Septra.  Please 

follow-up with your primary care doctor 1 of the clinics listed in 3 to 5 days. 

Return to ER for any worsening symptoms, including fever not controlled by 

medication, nausea/vomiting, abdominal pain, coughing up blood, chest pain, 

shortness of breath, or any other symptoms that are concerning to you.


Prescriptions: 


Benzonatate [Tessalon Perle 100 mg Capsule] 100 mg PO Q8HP PRN #40 cap


 PRN Reason: 


Fexofenadine/Pseudoephedrine [Allegra-D 24 Hour Tablet] 1 each PO DAILY #20 tab.

er.24h


Fluticasone Propionate [Flonase Nasal Spray 50 Mcg/Spray 16 gm] 2 sprays NASL 

Q12 #1 inhaler


Forms:  Return to Work


Referrals: 


PAUL SALAZAR MD [COMMUNITY BASED STAFF] - Follow up in 3-5 days


[facility] [Provider Group] - Follow up in 3-5 days